# Patient Record
Sex: FEMALE | Race: WHITE | NOT HISPANIC OR LATINO | Employment: FULL TIME | URBAN - METROPOLITAN AREA
[De-identification: names, ages, dates, MRNs, and addresses within clinical notes are randomized per-mention and may not be internally consistent; named-entity substitution may affect disease eponyms.]

---

## 2017-03-27 ENCOUNTER — OFFICE VISIT (OUTPATIENT)
Dept: OBSTETRICS AND GYNECOLOGY | Age: 47
End: 2017-03-27

## 2017-03-27 VITALS
BODY MASS INDEX: 26.13 KG/M2 | SYSTOLIC BLOOD PRESSURE: 100 MMHG | WEIGHT: 142 LBS | DIASTOLIC BLOOD PRESSURE: 60 MMHG | HEIGHT: 62 IN

## 2017-03-27 DIAGNOSIS — Z01.419 ENCOUNTER FOR GYNECOLOGICAL EXAMINATION: Primary | ICD-10-CM

## 2017-03-27 PROCEDURE — 99396 PREV VISIT EST AGE 40-64: CPT | Performed by: OBSTETRICS & GYNECOLOGY

## 2017-03-27 RX ORDER — PRENATAL VIT NO.126/IRON/FOLIC 28MG-0.8MG
TABLET ORAL DAILY
COMMUNITY

## 2017-03-27 RX ORDER — VALACYCLOVIR HYDROCHLORIDE 1 G/1
TABLET, FILM COATED ORAL
Refills: 0 | COMMUNITY
Start: 2017-02-01 | End: 2020-06-10

## 2017-03-27 RX ORDER — SWAB
SWAB, NON-MEDICATED MISCELLANEOUS
COMMUNITY

## 2017-03-27 NOTE — PROGRESS NOTES
"Subjective     Chief Complaint   Patient presents with   • Gynecologic Exam     Annual         History of Present Illness      Lori Rocha is a very pleasant  47 y.o. female who presents for annual exam.  Mammo ExamThermagram only  we have reviewed multiple occasions the limited data and ability of thermograms to diagnose breast cancer and breast changes.  Patient is aware that but still declines mammogram, Contraception vas., Exercise 6 x wkly.      Obstetric History:  OB History     No data available         Menstrual History:     Patient's last menstrual period was 03/01/2017.       Sexual History:       Past Medical History:   Diagnosis Date   • Abnormal Pap smear of cervix    • Anemia    • Irregular menses    • Pap smear abnormality of cervix with ASCUS favoring benign      No past surgical history on file.    SOCIAL Hx:      The following portions of the patient's history were reviewed and updated as appropriate: allergies, current medications, past family history, past medical history, past social history, past surgical history and problem list.    Review of Systems        Except as outlined in history of physical illness, patient denies any changes in her GYN, , GI systems.  All other systems reviewed were negative.         Objective   Physical Exam    /60  Ht 62.25\" (158.1 cm)  Wt 142 lb (64.4 kg)  LMP 03/01/2017  BMI 25.76 kg/m2    General: Patient is alert and oriented and appears overall healthy  Neck: Is supple without thyromegaly, no carotid bruits and no lymphadenopathy  Lungs: Clear bilaterally, no wheezing, rhonchi, or rales.  Respiratory rate is normal  Breast: Even, symmetrical, no lymphadenopathy, no retraction, no masses or cysts  Heart: Regular rate and rhythm are appreciated, no murmurs or rubs are heard  Abdomen: Is soft, without organomegaly, bowel sounds are positive, there is no                                rebound or guarding and palpation does not produce any " discomfort  Back: Nontender without CVA tenderness  Pelvic: External genitalia appear normal and consistent with mature female.  BUS normal                            Vagina is clean dry without discharge and appears adequately estrogenized, no               lesions or masses are present                         Cervix is noninflamed without discharge or lesions.  There is no cervical motion             tenderness.                Uterus is nonenlarged, without tenderness, and no masses or abnormalities are  present               Adnexa are non-enlarged, non tender               Rectal exam reveals adequate sphincter tone and no masses or lesions are                     appreciated on digital rectal examination.    There is no problem list on file for this patient.                Assessment/Plan   Lori was seen today for gynecologic exam.    Diagnoses and all orders for this visit:    Encounter for gynecological examination  -     IGP, Apt HPV,rfx 16 / 18,45     patient refuses mammogram, she is getting her wellness labs with her PCP   Annual Well Woman Exam    Discussed today's findings and concerns with patient in detail.  Continue to recommend regular exercise to include cardiovascular and resistance training and breast self-exam. Wellness lab, mammography, and pap smear, in accordance with age guidelines.  Nutritional and caloric recommendations discussed.    All of the patient's questions were addressed and answered, I have encouraged her to call for today's test results if she has not received them within 10 days.  Patient is advised to call with any change in her condition or with any other questions, otherwise return in 12 months for annual examination.

## 2017-03-30 LAB
CYTOLOGIST CVX/VAG CYTO: NORMAL
CYTOLOGY CVX/VAG DOC THIN PREP: NORMAL
DX ICD CODE: NORMAL
HIV 1 & 2 AB SER-IMP: NORMAL
HPV I/H RISK 4 DNA CVX QL PROBE+SIG AMP: NEGATIVE
Lab: NORMAL
OTHER STN SPEC: NORMAL
PATH REPORT.FINAL DX SPEC: NORMAL
STAT OF ADQ CVX/VAG CYTO-IMP: NORMAL

## 2018-04-03 ENCOUNTER — OFFICE VISIT (OUTPATIENT)
Dept: OBSTETRICS AND GYNECOLOGY | Age: 48
End: 2018-04-03

## 2018-04-03 VITALS
DIASTOLIC BLOOD PRESSURE: 64 MMHG | BODY MASS INDEX: 26.13 KG/M2 | WEIGHT: 142 LBS | HEIGHT: 62 IN | SYSTOLIC BLOOD PRESSURE: 110 MMHG

## 2018-04-03 DIAGNOSIS — N95.1 MENOPAUSAL SYMPTOMS: ICD-10-CM

## 2018-04-03 DIAGNOSIS — Z01.419 ENCOUNTER FOR GYNECOLOGICAL EXAMINATION: Primary | ICD-10-CM

## 2018-04-03 PROCEDURE — 99396 PREV VISIT EST AGE 40-64: CPT | Performed by: OBSTETRICS & GYNECOLOGY

## 2018-04-03 NOTE — PROGRESS NOTES
"Subjective   Chief Complaint   Patient presents with   • Gynecologic Exam     Annual:Thermagram 4/2017,low risk      History of Present Illness    Lori Rocha is a very pleasant  48 y.o. female who presents for annual exam.  , Mammo Exam Continues to decline only once a thermogram's, we have reviewed the liability's of that type and screen, Contraception menopausal, Exercise times a week.    Obstetric History:  OB History     No data available         Menstrual History:     Patient's last menstrual period was 02/26/2018 (approximate).       Sexual History:       Past Medical History:   Diagnosis Date   • Abnormal Pap smear of cervix    • Anemia    • Irregular menses    • Pap smear abnormality of cervix with ASCUS favoring benign      No past surgical history on file.    SOCIAL Hx:      The following portions of the patient's history were reviewed and updated as appropriate: allergies, current medications, past family history, past medical history, past social history, past surgical history and problem list.    Review of Systems        Except as outlined in history of physical illness, patient denies any changes in her GYN, , GI systems.  All other systems reviewed are negative         Objective   Physical Exam    /64   Ht 158.1 cm (62.25\")   Wt 64.4 kg (142 lb)   LMP 02/26/2018 (Approximate)   BMI 25.76 kg/m²     General: Patient is alert and oriented and appears overall healthy  Neck: Is supple without thyromegaly, no carotid bruits and no lymphadenopathy  Lungs: Clear bilaterally, no wheezing, rhonchi, or rales.  Respiratory rate is normal  Breast: Even symmetrical, no lymphadenopathy, no retraction, no masses appreciated on either side  Heart: Regular rate and rhythm are appreciated, no murmurs or rubs are heard  Abdomen: Is soft, without organomegaly, bowel sounds are positive, there is no                                rebound or guarding and palpation does not produce any discomfort  Back: " Nontender without CVA tenderness  Pelvic: External genitalia appear normal and consistent with mature female.  BUS normal              Urethra appears normal and without mass, bladder is nontender and without any               Masses.               Vagina is clean dry without discharge and appears adequately estrogenized, no               lesions or masses are present                         Cervix is noninflamed without discharge or lesions.  There is no cervical motion             tenderness.                Uterus is nonenlarged, without tenderness, and no masses or abnormalities are  present               Adnexa are non-enlarged, non tender               Rectal exam reveals adequate sphincter tone and no masses or lesions are                     appreciated on digital rectal examination.     There is no problem list on file for this patient.               Assessment/Plan   Lori was seen today for gynecologic exam.    Diagnoses and all orders for this visit:    Encounter for gynecological examination  -     IGP, Apt HPV,rfx 16 / 18,45 - ThinPrep Vial, Cervix    Menopausal symptoms        Annual Well Woman Exam    Discussed today's findings and concerns with patient in detail.  Continue to recommend regular exercise to include cardiovascular and resistance training and breast self-exam. Wellness lab, mammography, & pap smear, in accordance with age guidelines.  Dietary and caloric recommendations were discussed.        All of the patient's questions were addressed and answered, I have encouraged her to call for today's test results if she has not received them within 10 days.  Patient is advised to call with any change in her condition or with any other questions, otherwise return in 12 months for annual examination.

## 2018-04-05 LAB
CYTOLOGIST CVX/VAG CYTO: NORMAL
CYTOLOGY CVX/VAG DOC THIN PREP: NORMAL
DX ICD CODE: NORMAL
HIV 1 & 2 AB SER-IMP: NORMAL
HPV I/H RISK 4 DNA CVX QL PROBE+SIG AMP: NEGATIVE
OTHER STN SPEC: NORMAL
PATH REPORT.FINAL DX SPEC: NORMAL
STAT OF ADQ CVX/VAG CYTO-IMP: NORMAL

## 2018-04-20 ENCOUNTER — TELEPHONE (OUTPATIENT)
Dept: OBSTETRICS AND GYNECOLOGY | Age: 48
End: 2018-04-20

## 2018-04-20 NOTE — TELEPHONE ENCOUNTER
Pt.notified of results.She wanted  to know that she could not have been happier with the service she received from [facility].

## 2018-04-20 NOTE — TELEPHONE ENCOUNTER
----- Message from Héctor Laguna MD sent at 4/18/2018  8:53 PM EDT -----  Please notify pt. That labs are with in normal limits

## 2018-12-07 ENCOUNTER — TELEPHONE (OUTPATIENT)
Dept: OBSTETRICS AND GYNECOLOGY | Age: 48
End: 2018-12-07

## 2019-04-09 ENCOUNTER — OFFICE VISIT (OUTPATIENT)
Dept: OBSTETRICS AND GYNECOLOGY | Age: 49
End: 2019-04-09

## 2019-04-09 VITALS
DIASTOLIC BLOOD PRESSURE: 64 MMHG | WEIGHT: 143 LBS | BODY MASS INDEX: 26.31 KG/M2 | SYSTOLIC BLOOD PRESSURE: 108 MMHG | HEIGHT: 62 IN

## 2019-04-09 DIAGNOSIS — Z01.419 ENCOUNTER FOR GYNECOLOGICAL EXAMINATION: Primary | ICD-10-CM

## 2019-04-09 PROCEDURE — 99396 PREV VISIT EST AGE 40-64: CPT | Performed by: OBSTETRICS & GYNECOLOGY

## 2019-04-09 RX ORDER — FUROSEMIDE 20 MG/1
TABLET ORAL
Refills: 0 | COMMUNITY
Start: 2019-03-06 | End: 2021-05-11

## 2019-04-09 RX ORDER — PRASTERONE (DHEA) 50 MG
20 CAPSULE ORAL DAILY
COMMUNITY

## 2019-04-09 RX ORDER — PHENTERMINE HYDROCHLORIDE 37.5 MG/1
TABLET ORAL
Refills: 0 | COMMUNITY
Start: 2019-03-21 | End: 2021-05-11

## 2019-04-09 RX ORDER — MULTIVIT WITH MINERALS/LUTEIN
250 TABLET ORAL DAILY
COMMUNITY

## 2019-04-09 NOTE — PROGRESS NOTES
"Subjective   Chief Complaint   Patient presents with   • Gynecologic Exam     Annual:last pap 4/2018,cologuard 4/2018,thermagram only      History of Present Illness    Lori Rocha is a very pleasant  49 y.o. female who presents for annual exam.  , Mammo Exam patient declines, , Exercise 6 times a week  Patient has no gynecological concerns or complaints.  She is perimenopausal cycles are a little bit irregular.  She continues to refuse mammogram.  She is on bioidentical hormones and they are checking her labs.  We recommend mended colonoscopy.  She has had some stress with her family.  There is been an unplanned pregnancy..    Obstetric History:  OB History     No data available         Menstrual History:     Patient's last menstrual period was 03/22/2019 (approximate).       Sexual History:       Past Medical History:   Diagnosis Date   • Abnormal Pap smear of cervix    • Anemia    • Irregular menses    • Pap smear abnormality of cervix with ASCUS favoring benign      History reviewed. No pertinent surgical history.    SOCIAL Hx:      The following portions of the patient's history were reviewed and updated as appropriate: allergies, current medications, past family history, past medical history, past social history, past surgical history and problem list.    Review of Systems        Except as outlined in history of physical illness, patient denies any changes in her GYN, , GI systems.  All other systems reviewed are negative         Objective   Physical Exam    /64   Ht 158.1 cm (62.25\")   Wt 64.9 kg (143 lb)   LMP 03/22/2019 (Approximate)   BMI 25.95 kg/m²     General: Patient is alert and oriented and appears overall healthy  Neck: Is supple without thyromegaly, no carotid bruits and no lymphadenopathy  Lungs: Clear bilaterally, no wheezing, rhonchi, or rales.  Respiratory rate is normal  Breast: Even symmetrical, no lymphadenopathy, no retraction, no masses appreciated on either side  Heart: " Regular rate and rhythm are appreciated, no murmurs or rubs are heard  Abdomen: Is soft, without organomegaly, bowel sounds are positive, there is no                                rebound or guarding and palpation does not produce any discomfort  Back: Nontender without CVA tenderness  Pelvic: External genitalia appear normal and consistent with mature female.  BUS normal              Urethra appears normal and without mass, bladder is nontender and without any               Masses.               Vagina is clean dry without discharge and appears adequately estrogenized, no               lesions or masses are present                         Cervix is noninflamed without discharge or lesions.  There is no cervical motion             tenderness.                Uterus is nonenlarged, without tenderness, and no masses or abnormalities are  present               Adnexa are non-enlarged, non tender               Rectal exam reveals adequate sphincter tone and no masses or lesions are                     appreciated on digital rectal examination.      Annual Well Woman Exam     There is no problem list on file for this patient.               Assessment/Plan   Lori was seen today for gynecologic exam.    Diagnoses and all orders for this visit:    Encounter for gynecological examination  -     IGP, Apt HPV,rfx 16 / 18,45      Again strongly recommended mammogram and colonoscopy patient declines at this stage.  She had a Cologuard April 2018    Discussed today's findings and concerns with patient in detail.  Continue to recommend regular exercise to include cardiovascular and resistance training and breast self-exam. Wellness lab, mammography, & pap smear, in accordance with age guidelines.  Dietary and caloric recommendations were discussed.        All of the patient's questions were addressed and answered, I have encouraged her to call for today's test results if she has not received them within 10 days.  Patient is  advised to call with any change in her condition or with any other questions, otherwise return in 12 months for annual examination.

## 2020-06-10 ENCOUNTER — TELEPHONE (OUTPATIENT)
Dept: OBSTETRICS AND GYNECOLOGY | Age: 50
End: 2020-06-10

## 2020-06-10 ENCOUNTER — OFFICE VISIT (OUTPATIENT)
Dept: OBSTETRICS AND GYNECOLOGY | Age: 50
End: 2020-06-10

## 2020-06-10 VITALS
DIASTOLIC BLOOD PRESSURE: 72 MMHG | SYSTOLIC BLOOD PRESSURE: 112 MMHG | BODY MASS INDEX: 26.5 KG/M2 | WEIGHT: 144 LBS | HEIGHT: 62 IN

## 2020-06-10 DIAGNOSIS — Z01.419 ENCOUNTER FOR GYNECOLOGICAL EXAMINATION: Primary | ICD-10-CM

## 2020-06-10 DIAGNOSIS — Z71.89 COUNSELING FOR HORMONE REPLACEMENT THERAPY: ICD-10-CM

## 2020-06-10 PROCEDURE — 99396 PREV VISIT EST AGE 40-64: CPT | Performed by: OBSTETRICS & GYNECOLOGY

## 2020-06-10 RX ORDER — SODIUM PHOSPHATE,MONO-DIBASIC 19G-7G/118
ENEMA (ML) RECTAL
COMMUNITY
End: 2021-05-11

## 2020-06-10 RX ORDER — KETOCONAZOLE 20 MG/ML
SHAMPOO TOPICAL
COMMUNITY
Start: 2020-04-01

## 2020-06-10 NOTE — TELEPHONE ENCOUNTER
Pt called, She stated her insurance carrier [employer] told her she doesn't have to wait 365 + a day for her next annual.  Pt wants to schedule for May 11, 2021 @ 10:30.    Spoke w/Adina Holden successfully did an override and appt was scheduled.

## 2020-06-10 NOTE — PROGRESS NOTES
"Subjective   Chief Complaint   Patient presents with   • Gynecologic Exam     Annual:last pap 4/19,Thermagram,6/20,cologuard 4/18      History of Present Illness    Lori Rocha is a very pleasant  50 y.o. female who presents for annual exam.  , Mammo Exam continues to decline despite recommendations otherwise she has had a thermogram and we talked about the significant limitations of that testing procedure, , Exercise 6 times a week  She is postmenopausal, receiving wellness labs from her PCP she also declines colonoscopy but had a Cologuard 2018 which was normal.  She is prediabetic and we discussed that.  She continues to use compound pharmacy and is under the care of someone else for that.  We have discussed risk of that hormonal approach  .    Obstetric History:  OB History    None        Menstrual History:     Patient's last menstrual period was 03/22/2019 (approximate).       Sexual History:       Past Medical History:   Diagnosis Date   • Abnormal Pap smear of cervix    • Anemia    • Pap smear abnormality of cervix with ASCUS favoring benign      History reviewed. No pertinent surgical history.    SOCIAL Hx:      The following portions of the patient's history were reviewed and updated as appropriate: allergies, current medications, past family history, past medical history, past social history, past surgical history and problem list.    Review of Systems        Except as outlined in history of physical illness, patient denies any changes in her GYN, , GI systems.  All other systems reviewed are negative         Objective   Physical Exam    /72   Ht 158.1 cm (62.25\")   Wt 65.3 kg (144 lb)   LMP 03/22/2019 (Approximate)   Breastfeeding No   BMI 26.13 kg/m²     General: Patient is alert and oriented and appears overall healthy  Neck: Is supple without thyromegaly, no carotid bruits and no lymphadenopathy  Lungs: Clear bilaterally, no wheezing, rhonchi, or rales.  Respiratory rate is " normal  Breast: Even symmetrical, no lymphadenopathy, no retraction, no masses appreciated on either side  Heart: Regular rate and rhythm are appreciated, no murmurs or rubs are heard  Abdomen: Is soft, without organomegaly, bowel sounds are positive, there is no                                rebound or guarding and palpation does not produce any discomfort  Back: Nontender without CVA tenderness  Pelvic: External genitalia appear normal and consistent with mature female.  BUS normal              Urethra appears normal and without mass, bladder is nontender and without any               Masses.               Vagina is clean dry without discharge and appears adequately estrogenized, no               lesions or masses are present                         Cervix is noninflamed without discharge or lesions.  There is no cervical motion             tenderness.                Uterus is nonenlarged, without tenderness, and no masses or abnormalities are  present               Adnexa are non-enlarged, non tender               Rectal exam reveals adequate sphincter tone and no masses or lesions are                     appreciated on digital rectal examination.      Annual Well Woman Exam     There is no problem list on file for this patient.               Assessment/Plan   Lori was seen today for gynecologic exam.    Diagnoses and all orders for this visit:    Encounter for gynecological examination  -     IGP, Apt HPV,rfx 16 / 18,45    Counseling for hormone replacement therapy    I am not directing this, she is under the care of someone else for bioidentical hormones and I have reviewed risk of that approach  Discussed today's findings and concerns with patient.  Continue to recommend regular exercise including cardiovascular and resistance training as well as  breast self-exam. Wellness lab, mammography, & pap smear, in accordance with age guidelines.    I have encouraged her to call for today's test results if she has  not received them within 10 days.  Patient is advised to call with any change in her condition or with any other questions, otherwise return  for annual examination.

## 2020-06-10 NOTE — TELEPHONE ENCOUNTER
Pt was here today and wanted to schedule her AE for May 11, 2021 at 10:30.  Her insurance pays for annual exams per calendar year.      Her ae this year was r/s due to covid.  She does not want to have AE in the summer.  It would not let me schedule May 11, 2021 - hard stop.

## 2020-06-12 LAB
CYTOLOGIST CVX/VAG CYTO: NORMAL
CYTOLOGY CVX/VAG DOC CYTO: NORMAL
CYTOLOGY CVX/VAG DOC THIN PREP: NORMAL
DX ICD CODE: NORMAL
HIV 1 & 2 AB SER-IMP: NORMAL
HPV I/H RISK 4 DNA CVX QL PROBE+SIG AMP: NEGATIVE
OTHER STN SPEC: NORMAL
STAT OF ADQ CVX/VAG CYTO-IMP: NORMAL

## 2021-05-11 ENCOUNTER — OFFICE VISIT (OUTPATIENT)
Dept: OBSTETRICS AND GYNECOLOGY | Age: 51
End: 2021-05-11

## 2021-05-11 VITALS
DIASTOLIC BLOOD PRESSURE: 68 MMHG | WEIGHT: 141 LBS | HEIGHT: 62 IN | BODY MASS INDEX: 25.95 KG/M2 | SYSTOLIC BLOOD PRESSURE: 110 MMHG

## 2021-05-11 DIAGNOSIS — Z01.419 ENCOUNTER FOR GYNECOLOGICAL EXAMINATION: Primary | ICD-10-CM

## 2021-05-11 PROCEDURE — 99396 PREV VISIT EST AGE 40-64: CPT | Performed by: OBSTETRICS & GYNECOLOGY

## 2021-05-11 RX ORDER — LEVOTHYROXINE SODIUM 75 MCG
TABLET ORAL
COMMUNITY
Start: 2021-04-20

## 2021-05-11 RX ORDER — THYROID,PORK 90 MG
TABLET ORAL
COMMUNITY
Start: 2021-03-03

## 2021-05-11 RX ORDER — CHLORAL HYDRATE 500 MG
CAPSULE ORAL
COMMUNITY

## 2021-05-11 NOTE — PROGRESS NOTES
Subjective   Chief Complaint   Patient presents with   • Gynecologic Exam     Annual:Last pap 6/20,Thermagram 6/20,cologuard,4/18      History of Present Illness    Lori Rocha is a very pleasant  51 y.o. female who presents for annual exam.  , Mammo Exam last 1 was in 2012 she continues to refuse repeat mammogram she is doing thermograms and we have discussed the limitations of those, , Exercise 6 times a week including running and walking  Patient's had Cologuard April 2018 that is all being managed by her PCP  She overall remains relatively healthy, she is exercising normally, she has had some issues with SIBO and I have discussed some options.  Her 4 children are doing well..    Obstetric History:  OB History    No obstetric history on file.        Menstrual History:     Patient's last menstrual period was 03/22/2019 (approximate).       Sexual History:       Past Medical History:   Diagnosis Date   • Abnormal Pap smear of cervix    • Anemia    • Pap smear abnormality of cervix with ASCUS favoring benign      No past surgical history on file.    Current Outpatient Medications:   •  [place] [place] Thyroid 90 MG tablet, , Disp: , Rfl:   •  DHEA 50 MG capsule, Take  by mouth., Disp: , Rfl:   •  ketoconazole (NIZORAL) 2 % shampoo, USE 10 MLS TOPICALLY TO SCALP TWICE A WEEK, Disp: , Rfl:   •  Methylcobalamin (METHYL B-12 PO), Take  by mouth., Disp: , Rfl:   •  Multiple Vitamin (MULTI-VITAMIN DAILY PO), Take  by mouth., Disp: , Rfl:   •  Omega-3 1000 MG capsule, Take  by mouth., Disp: , Rfl:   •  Probiotic Product (PROBIOTIC ADVANCED PO), Take  by mouth., Disp: , Rfl:   •  Synthroid 75 MCG tablet, , Disp: , Rfl:   •  vitamin C (ASCORBIC ACID) 250 MG tablet, Take 250 mg by mouth Daily., Disp: , Rfl:   •  Vitamin D, Cholecalciferol, 400 UNITS capsule, Take  by mouth., Disp: , Rfl:   •  diclofenac (VOLTAREN) 1 % gel gel, Place 2 g on the skin., Disp: , Rfl:   •  Prenatal Vit-Fe Fumarate-FA (PRENATAL, CLASSIC, VITAMIN) 28-0.8  "MG tablet tablet, Take  by mouth Daily., Disp: , Rfl:   •  progesterone (PROMETRIUM) 100 MG capsule, Take 100 mg by mouth Daily., Disp: , Rfl:    SOCIAL Hx:      The following portions of the patient's history were reviewed and updated as appropriate: allergies, current medications, past family history, past medical history, past social history, past surgical history and problem list.    Review of Systems        Except as outlined in history of physical illness, patient denies any changes in her GYN, , GI systems.  All other systems reviewed are negative         Objective   Physical Exam    /68   Ht 158.1 cm (62.25\")   Wt 64 kg (141 lb)   LMP 03/22/2019 (Approximate)   Breastfeeding No   BMI 25.58 kg/m²     General: Patient is alert and oriented and appears overall healthy  Neck: Is supple without thyromegaly, no carotid bruits and no lymphadenopathy  Lungs: Clear bilaterally, no wheezing, rhonchi, or rales.  Respiratory rate is normal  Breast: Even symmetrical, no lymphadenopathy, no retraction, no discharge ,no masses appreciated on either side  Heart: Regular rate and rhythm are appreciated, no murmurs or rubs are heard  Abdomen: Is soft, without organomegaly, bowel sounds are positive, there is no                                rebound or guarding and palpation does not produce any discomfort  Back: Nontender without CVA tenderness  Pelvic: External genitalia appear normal and consistent with mature female.  BUS normal              Urethra appears normal and without mass, bladder is nontender and without any lesions                        Urethral meatus is normal without scarring tenderness or masses                 Bladder is without tenderness or fullness                           Vagina is clean dry without discharge and appears adequately estrogenized, no               lesions or masses are present                         Cervix is noninflamed without discharge or lesions.  There is no cervical " motion             tenderness.                Uterus is nonenlarged, without tenderness, and no masses or abnormalities are  present               Adnexa are non-enlarged, non tender               Rectal digital  exam reveals adequate sphincter tone and no masses or lesions are                     appreciated on digital rectal examination.      Annual Well Woman Exam     There is no problem list on file for this patient.               Assessment/Plan   Diagnoses and all orders for this visit:    1. Encounter for gynecological examination (Primary)  -     IGP, Apt HPV,rfx 16 / 18,45      Discussed today's findings and concerns with patient.  Continue to recommend regular exercise including cardiovascular and resistance training as well as  breast self-exam. Wellness lab, mammography, & pap smear, in accordance with age guidelines.    I have encouraged her to call for today's test results if she has not received them within 10 days.  Patient is advised to call with any change in her condition or with any other questions, otherwise return  for annual examination.

## 2021-09-15 ENCOUNTER — TELEPHONE (OUTPATIENT)
Dept: GASTROENTEROLOGY | Facility: CLINIC | Age: 51
End: 2021-09-15

## 2021-09-15 ENCOUNTER — OFFICE VISIT (OUTPATIENT)
Dept: GASTROENTEROLOGY | Facility: CLINIC | Age: 51
End: 2021-09-15

## 2021-09-15 VITALS
HEIGHT: 62 IN | BODY MASS INDEX: 26.5 KG/M2 | DIASTOLIC BLOOD PRESSURE: 68 MMHG | SYSTOLIC BLOOD PRESSURE: 120 MMHG | WEIGHT: 144 LBS

## 2021-09-15 DIAGNOSIS — R14.3 FLATUS: Primary | ICD-10-CM

## 2021-09-15 PROCEDURE — 99204 OFFICE O/P NEW MOD 45 MIN: CPT | Performed by: INTERNAL MEDICINE

## 2021-09-15 NOTE — PROGRESS NOTES
Chief Complaint   Patient presents with   • Gas       History of Present Illness:   51 y.o. female who is here for flatus which started when she had clostridium dificile 6 yrs ago after taking antibiotics. She takes fiber daily (Fiberwise 1 scoop BID), digestive enzymes, Florajen daily (probiotic). No diarrhea. No constiaption. 2 BM/day. If she eats gluten or sugar she will have more flatus. She fasts 16 hrs/day. No rectal bleeding or melena. Never had a colonoscopy. Cologuard 3 yrs ago negative. Sister had colon polyps. No abdominal or chest pain. She eats some dairy. She is a weight . She has gained weight. No nausea or vomiting. No heartburn. NO dysphagia.        She had Covid 8 mos ago. Not had the vaccine. Her brother was quite ill from Covid.     Past Medical History:   Diagnosis Date   • Abnormal Pap smear of cervix    • Anemia    • Pap smear abnormality of cervix with ASCUS favoring benign        History reviewed. No pertinent surgical history.      Current Outpatient Medications:   •  Chromium 500 MCG tablet, Take  by mouth Daily., Disp: , Rfl:   •  DHEA 50 MG capsule, Take 20 mg by mouth Daily., Disp: , Rfl:   •  ketoconazole (NIZORAL) 2 % shampoo, USE 10 MLS TOPICALLY TO SCALP TWICE A WEEK, Disp: , Rfl:   •  Methylcobalamin (METHYL B-12 PO), Take  by mouth., Disp: , Rfl:   •  Multiple Vitamin (MULTI-VITAMIN DAILY PO), Take  by mouth., Disp: , Rfl:   •  NON FORMULARY, Daily. Generic fiber drink supplement, Disp: , Rfl:   •  Omega-3 1000 MG capsule, Take  by mouth., Disp: , Rfl:   •  Probiotic Product (PROBIOTIC ADVANCED PO), Take  by mouth., Disp: , Rfl:   •  vitamin C (ASCORBIC ACID) 250 MG tablet, Take 250 mg by mouth Daily., Disp: , Rfl:   •  Vitamin D, Cholecalciferol, 400 UNITS capsule, Take  by mouth., Disp: , Rfl:   •  [place] Thyroid 90 MG tablet, , Disp: , Rfl:   •  diclofenac (VOLTAREN) 1 % gel gel, Place 2 g on the skin., Disp: , Rfl:   •  Prenatal Vit-Fe Fumarate-FA (PRENATAL, CLASSIC,  VITAMIN) 28-0.8 MG tablet tablet, Take  by mouth Daily., Disp: , Rfl:   •  progesterone (PROMETRIUM) 100 MG capsule, Take 100 mg by mouth Daily., Disp: , Rfl:   •  Synthroid 75 MCG tablet, , Disp: , Rfl:     Allergies   Allergen Reactions   • No Known Drug Allergy        History reviewed. No pertinent family history.    Social History     Socioeconomic History   • Marital status:      Spouse name: Not on file   • Number of children: Not on file   • Years of education: Not on file   • Highest education level: Not on file   Tobacco Use   • Smoking status: Never Smoker   • Smokeless tobacco: Never Used   Substance and Sexual Activity   • Alcohol use: No   • Drug use: No       Review of Systems   Gastrointestinal: Negative for abdominal distention, abdominal pain, constipation and diarrhea.   All other systems reviewed and are negative.    Pertinent positives and negatives documented in the HPI and all other systems reviewed and were found to be negative.  Vitals:    09/15/21 1022   BP: 120/68       Physical Exam  Vitals reviewed.   Constitutional:       General: She is not in acute distress.     Appearance: Normal appearance. She is well-developed. She is not diaphoretic.   HENT:      Head: Normocephalic and atraumatic. Hair is normal.      Right Ear: Hearing, tympanic membrane, ear canal and external ear normal. No decreased hearing noted. No drainage.      Left Ear: Hearing, tympanic membrane, ear canal and external ear normal. No decreased hearing noted.      Nose: Nose normal. No nasal deformity.      Mouth/Throat:      Mouth: Mucous membranes are moist.   Eyes:      General: Lids are normal.         Right eye: No discharge.         Left eye: No discharge.      Extraocular Movements: Extraocular movements intact.      Conjunctiva/sclera: Conjunctivae normal.      Pupils: Pupils are equal, round, and reactive to light.   Neck:      Thyroid: No thyromegaly.      Vascular: No JVD.      Trachea: No tracheal  deviation.   Cardiovascular:      Rate and Rhythm: Normal rate and regular rhythm.      Pulses: Normal pulses.      Heart sounds: Normal heart sounds. No murmur heard.   No friction rub. No gallop.    Pulmonary:      Effort: Pulmonary effort is normal. No respiratory distress.      Breath sounds: Normal breath sounds. No wheezing or rales.   Chest:      Chest wall: No tenderness.   Abdominal:      General: Bowel sounds are normal. There is no distension.      Palpations: Abdomen is soft. There is no mass.      Tenderness: There is no abdominal tenderness. There is no guarding or rebound.      Hernia: No hernia is present.   Genitourinary:     Rectum: Normal. Guaiac result negative.   Musculoskeletal:         General: No tenderness or deformity. Normal range of motion.      Cervical back: Normal range of motion and neck supple.   Lymphadenopathy:      Cervical: No cervical adenopathy.   Skin:     General: Skin is warm and dry.      Findings: No erythema or rash.   Neurological:      Mental Status: She is alert and oriented to person, place, and time.      Cranial Nerves: No cranial nerve deficit.      Motor: No abnormal muscle tone.      Coordination: Coordination normal.      Deep Tendon Reflexes: Reflexes are normal and symmetric. Reflexes normal.   Psychiatric:         Mood and Affect: Mood normal.         Behavior: Behavior normal.         Thought Content: Thought content normal.         Judgment: Judgment normal.         Diagnoses and all orders for this visit:    1. Flatus (Primary)  -     Celiac Ab tTG DGP TIgA      Assessment:  1. Flatus - she is on lots of supplements.  2. FH (sister) colon polyps.  3.     Recommendations:  1. Get the results of labs done by PCP recently.   2. Labs: Celiac sprue antibody test.  3. Consider stopping all supplements for one month. Will hold off on.  4. Lactose free diet for 1 month.  5. I recommend a colononscopy. I want her to think about it.   6. F/u 4 weeks.     No  follow-ups on file.    Reece Joel MD  9/15/2021

## 2021-09-15 NOTE — TELEPHONE ENCOUNTER
----- Message from Reece Joel MD sent at 9/15/2021 11:20 AM EDT -----  Get the results of the recent labs done by her PCP.

## 2021-09-16 LAB
GLIADIN PEPTIDE IGA SER-ACNC: 6 UNITS (ref 0–19)
GLIADIN PEPTIDE IGG SER-ACNC: 4 UNITS (ref 0–19)
IGA SERPL-MCNC: 121 MG/DL (ref 87–352)
TTG IGA SER-ACNC: <2 U/ML (ref 0–3)
TTG IGG SER-ACNC: <2 U/ML (ref 0–5)

## 2021-09-17 NOTE — PROGRESS NOTES
09/17/21  Tell her that her celiac sprue antibody panel came back normal. FAX to her PCP.   Myke gallagher

## 2021-09-21 NOTE — TELEPHONE ENCOUNTER
Attempt call to PCP office - rings without answer.     Call to [facility] and spoke with Ophelia.  Labs of 9/13 will be faxed to .

## 2021-09-22 ENCOUNTER — TELEPHONE (OUTPATIENT)
Dept: GASTROENTEROLOGY | Facility: CLINIC | Age: 51
End: 2021-09-22

## 2021-09-22 NOTE — TELEPHONE ENCOUNTER
----- Message from Reece Joel MD sent at 9/17/2021  6:47 AM EDT -----  09/17/21  Tell her that her celiac sprue antibody panel came back normal. FAX to her PCP.   Myke gallagher

## 2022-05-17 ENCOUNTER — OFFICE VISIT (OUTPATIENT)
Dept: OBSTETRICS AND GYNECOLOGY | Age: 52
End: 2022-05-17

## 2022-05-17 VITALS
WEIGHT: 141 LBS | HEIGHT: 62 IN | SYSTOLIC BLOOD PRESSURE: 120 MMHG | BODY MASS INDEX: 25.95 KG/M2 | DIASTOLIC BLOOD PRESSURE: 76 MMHG

## 2022-05-17 DIAGNOSIS — Z01.419 ENCOUNTER FOR GYNECOLOGICAL EXAMINATION: Primary | ICD-10-CM

## 2022-05-17 DIAGNOSIS — Z71.89 COUNSELING FOR HORMONE REPLACEMENT THERAPY: ICD-10-CM

## 2022-05-17 PROCEDURE — 99396 PREV VISIT EST AGE 40-64: CPT | Performed by: OBSTETRICS & GYNECOLOGY

## 2022-05-17 RX ORDER — LEVOTHYROXINE, LIOTHYRONINE 38; 9 UG/1; UG/1
TABLET ORAL
COMMUNITY
Start: 2022-05-13

## 2022-05-17 RX ORDER — LEVOTHYROXINE, LIOTHYRONINE 9.5; 2.25 UG/1; UG/1
TABLET ORAL
COMMUNITY
Start: 2022-03-18

## 2022-05-17 RX ORDER — UBIDECARENONE 100 MG
100 CAPSULE ORAL DAILY
COMMUNITY

## 2022-05-17 RX ORDER — LEVOTHYROXINE, LIOTHYRONINE 19; 4.5 UG/1; UG/1
TABLET ORAL
COMMUNITY
Start: 2022-05-13

## 2022-05-17 NOTE — PROGRESS NOTES
Subjective   Chief Complaint   Patient presents with   • Gynecologic Exam     Annual:Last pap 5/21,Thermogram 2021,cologuard 2021      History of Present Illness    Lori Rocha is a very pleasant  52 y.o. female .  , Mammo Exam patient again declines she does thermograms and we discussed each year the need for mammogram, , Exercise 6 times a week    Patient is postmenopausal    She sees a functional medicine doctor who used to prescribe hormones but no longer doing that.  She is doing intermittent fasting.  She had a Cologuard last year..    Her family seems to be doing well    Obstetric History:  OB History    No obstetric history on file.        Menstrual History:     Patient's last menstrual period was 03/22/2019 (approximate).       Sexual History:       Past Medical History:   Diagnosis Date   • Abnormal Pap smear of cervix    • Anemia    • Pap smear abnormality of cervix with ASCUS favoring benign      No past surgical history on file.    Current Outpatient Medications:   •  Chromium 500 MCG tablet, Take  by mouth Daily., Disp: , Rfl:   •  coenzyme Q10 100 MG capsule, Take 100 mg by mouth Daily., Disp: , Rfl:   •  DHEA 50 MG capsule, Take 20 mg by mouth Daily., Disp: , Rfl:   •  Multiple Vitamin (MULTI-VITAMIN DAILY PO), Take  by mouth., Disp: , Rfl:   •  NON FORMULARY, Daily. Generic fiber drink supplement, Disp: , Rfl:   •  NP Thyroid 15 MG tablet, , Disp: , Rfl:   •  NP Thyroid 30 MG tablet, TAKE 1/2 TABLET BY MOUTH EVERY MORNING WITH 60MG TABLET FOR A TOTAL OF 75 MG DAILY, Disp: , Rfl:   •  NP Thyroid 60 MG tablet, TAKE ONE TABLET BY MOUTH EVERY MORNING WITH 15MG FOR A TOTAL OF 75 MG DAILY, Disp: , Rfl:   •  Omega-3 1000 MG capsule, Take  by mouth., Disp: , Rfl:   •  Prenatal Vit-Fe Fumarate-FA (PRENATAL, CLASSIC, VITAMIN) 28-0.8 MG tablet tablet, Take  by mouth Daily., Disp: , Rfl:   •  progesterone (PROMETRIUM) 100 MG capsule, Take 100 mg by mouth Daily., Disp: , Rfl:   •  Vitamin D, Cholecalciferol,  "400 UNITS capsule, Take  by mouth., Disp: , Rfl:   •  [place] Thyroid 90 MG tablet, , Disp: , Rfl:   •  Continuous Blood Gluc Sensor (FreeStyle Thuy 2 Sensor) misc, USE TO monitor blood sugar WITH Free Style Thuy APPLICATORFUL, Disp: , Rfl:   •  diclofenac (VOLTAREN) 1 % gel gel, Place 2 g on the skin., Disp: , Rfl:   •  ketoconazole (NIZORAL) 2 % shampoo, USE 10 MLS TOPICALLY TO SCALP TWICE A WEEK, Disp: , Rfl:   •  Methylcobalamin (METHYL B-12 PO), Take  by mouth., Disp: , Rfl:   •  Probiotic Product (PROBIOTIC ADVANCED PO), Take  by mouth., Disp: , Rfl:   •  Synthroid 75 MCG tablet, , Disp: , Rfl:   •  vitamin C (ASCORBIC ACID) 250 MG tablet, Take 250 mg by mouth Daily., Disp: , Rfl:    SOCIAL Hx:  [unfilled]    The following portions of the patient's history were reviewed and updated as appropriate: allergies, current medications, past family history, past medical history, past social history, past surgical history and problem list.    Review of Systems      Urinary incontinence assessment discussed      Except as outlined in history of physical illness, patient denies any changes in her GYN, , GI systems.  All other systems reviewed are negative         Objective   Physical Exam    /76   Ht 158.1 cm (62.25\")   Wt 64 kg (141 lb)   LMP 03/22/2019 (Approximate)   Breastfeeding No   BMI 25.58 kg/m²     General: Patient is alert and oriented and appears overall healthy  Neck: Is supple without thyromegaly, no carotid bruits and no lymphadenopathy  Lungs: Clear bilaterally, no wheezing, rhonchi, or rales.  Respiratory rate is normal  Breast: Even symmetrical, no lymphadenopathy, no retraction, no discharge ,no masses , lumps appreciated on either side  Heart: Regular rate and rhythm are appreciated, no murmurs or rubs are heard  Abdomen: Is soft, without organomegaly, bowel sounds are positive, there is no rebound or guarding and palpation does not produce any discomfort  Back: Nontender without CVA " tenderness  Pelvic: External genitalia appear normal and consistent with mature female.  BUS normal                Urethra appears normal and without mass, bladder is nontender and without any lesions                        Urethral meatus is normal without scarring tenderness or masses                 Bladder is without tenderness or fullness                           Vagina is clean dry without discharge and , no lesions or masses are present                         Cervix is noninflamed without discharge or lesions.  There is no cervical motion tenderness.                Uterus is nonenlarged, without tenderness, and no masses or abnormalities are  present               Adnexa are non-enlarged, non tender               Rectal digital  exam reveals adequate sphincter tone and no masses or lesions are appreciated on digital rectal examination.       There is no problem list on file for this patient.               Assessment & Plan   Diagnoses and all orders for this visit:    1. Encounter for gynecological examination (Primary)  -     IGP, Apt HPV,rfx 16 / 18,45    2. Counseling for hormone replacement therapy    Patient is no longer doing bioidentical with a functional medicine physician.  We spent a long time talking about nutrition and exercise the pros and cons of intermittent fasting.    Fortunately she continues to decline mammogram  Discussed today's findings and concerns with patient.  Continue to recommend regular exercise including cardiovascular and resistance training as well as  breast self-exam. Wellness lab, mammography, & pap smear, in accordance with age guidelines.    I have encouraged her to call for today's test results if she has not received them within 10 days.  Patient is advised to call with any change in her condition or with any other questions, otherwise return  for annual examination.

## 2023-06-05 ENCOUNTER — OFFICE VISIT (OUTPATIENT)
Dept: OBSTETRICS AND GYNECOLOGY | Age: 53
End: 2023-06-05
Payer: COMMERCIAL

## 2023-06-05 VITALS
SYSTOLIC BLOOD PRESSURE: 110 MMHG | DIASTOLIC BLOOD PRESSURE: 68 MMHG | BODY MASS INDEX: 24.84 KG/M2 | HEIGHT: 62 IN | WEIGHT: 135 LBS

## 2023-06-05 DIAGNOSIS — Z12.31 BREAST CANCER SCREENING BY MAMMOGRAM: Primary | ICD-10-CM

## 2023-06-05 DIAGNOSIS — Z01.419 ENCOUNTER FOR GYNECOLOGICAL EXAMINATION: ICD-10-CM

## 2023-06-05 DIAGNOSIS — Z00.00 ENCOUNTER FOR ANNUAL PHYSICAL EXAM: ICD-10-CM

## 2023-06-05 DIAGNOSIS — Z12.4 SCREENING FOR CERVICAL CANCER: ICD-10-CM

## 2023-06-05 PROCEDURE — 99396 PREV VISIT EST AGE 40-64: CPT | Performed by: OBSTETRICS & GYNECOLOGY

## 2023-06-05 NOTE — PROGRESS NOTES
Subjective   No chief complaint on file.     History of Present Illness  Wellness exam  Lori Rocha is a very pleasant  53 y.o. female .  , Mammo Exam yes, , Exercise encouraged  Patient here for annual exam no gynecological concerns or complaints  Medicines.  Patient has PCP she is getting wellness labs there.  She has historically declined mammograms continues to do so despite strong recommendations otherwise.    Obstetric History:  OB History    No obstetric history on file.        Menstrual History:     Patient's last menstrual period was 03/22/2019 (approximate).       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    Current Outpatient Medications:     [place] Thyroid 90 MG tablet, , Disp: , Rfl:     Chromium 500 MCG tablet, Take  by mouth Daily., Disp: , Rfl:     coenzyme Q10 100 MG capsule, Take 100 mg by mouth Daily., Disp: , Rfl:     Continuous Blood Gluc Sensor (FreeStyle Thuy 2 Sensor) misc, USE TO monitor blood sugar WITH Free Style Thuy APPLICATORFUL, Disp: , Rfl:     DHEA 50 MG capsule, Take 20 mg by mouth Daily., Disp: , Rfl:     diclofenac (VOLTAREN) 1 % gel gel, Place 2 g on the skin., Disp: , Rfl:     ketoconazole (NIZORAL) 2 % shampoo, USE 10 MLS TOPICALLY TO SCALP TWICE A WEEK, Disp: , Rfl:     Methylcobalamin (METHYL B-12 PO), Take  by mouth., Disp: , Rfl:     Multiple Vitamin (MULTI-VITAMIN DAILY PO), Take  by mouth., Disp: , Rfl:     NON FORMULARY, Daily. Generic fiber drink supplement, Disp: , Rfl:     NP Thyroid 15 MG tablet, , Disp: , Rfl:     NP Thyroid 30 MG tablet, TAKE 1/2 TABLET BY MOUTH EVERY MORNING WITH 60MG TABLET FOR A TOTAL OF 75 MG DAILY, Disp: , Rfl:     NP Thyroid 60 MG tablet, TAKE ONE TABLET BY MOUTH EVERY MORNING WITH 15MG FOR A TOTAL OF 75 MG DAILY, Disp: , Rfl:     Omega-3 1000 MG capsule, Take  by mouth.,  Disp: , Rfl:     Prenatal Vit-Fe Fumarate-FA (PRENATAL, CLASSIC, VITAMIN) 28-0.8 MG tablet tablet, Take  by mouth Daily., Disp: , Rfl:     Probiotic Product (PROBIOTIC ADVANCED PO), Take  by mouth., Disp: , Rfl:     progesterone (PROMETRIUM) 100 MG capsule, Take 100 mg by mouth Daily., Disp: , Rfl:     Synthroid 75 MCG tablet, , Disp: , Rfl:     vitamin C (ASCORBIC ACID) 250 MG tablet, Take 250 mg by mouth Daily., Disp: , Rfl:     Vitamin D, Cholecalciferol, 400 UNITS capsule, Take  by mouth., Disp: , Rfl:    SOCIAL Hx:  [unfilled]    The following portions of the patient's history were reviewed and updated as appropriate: allergies, current medications, past family history, past medical history, past social history, past surgical history and problem list.    Review of Systems      Urinary incontinence assessment discussed      Except as outlined in history of physical illness, patient denies any changes in her GYN, , GI systems.  All other systems reviewed are negative         Objective   Physical Exam    LMP 03/22/2019 (Approximate)     General: Patient is alert and oriented and appears overall healthy  Neck: Is supple without thyromegaly, no carotid bruits and no lymphadenopathy  Lungs: Clear bilaterally, no wheezing, rhonchi, or rales.  Respiratory rate is normal  Breast: Even symmetrical, no lymphadenopathy, no retraction, no discharge ,no masses , lumps appreciated on either side  Heart: Regular rate and rhythm are appreciated, no murmurs or rubs are heard  Abdomen: Is soft, without organomegaly, bowel sounds are positive, there is no rebound or guarding and palpation does not produce any discomfort  Back: Nontender without CVA tenderness  Pelvic: External genitalia appear normal and consistent with mature female.  BUS normal                Urethra appears normal and without mass, bladder is nontender and without any lesions                        Urethral meatus is normal without scarring tenderness or  masses                 Bladder is without tenderness or fullness                           Vagina is clean dry without discharge and , no lesions or masses are present                         Cervix is noninflamed without discharge or lesions.  There is no cervical motion tenderness.                Uterus is nonenlarged, without tenderness, and no masses or abnormalities are  present               Adnexa are non-enlarged, non tender               Rectal digital  exam reveals adequate sphincter tone and no masses or lesions are appreciated on digital rectal examination.       There is no problem list on file for this patient.               Assessment & Plan   Diagnoses and all orders for this visit:    1. Breast cancer screening by mammogram (Primary)  Despite strong recommendations for screening, patient continues to decline patient gets thermograms and we discussed the reliability concerns.  Fortunately today's breast exam is reassuring but she understands limitations of clinical exam  2. Screening for cervical cancer  Pap smear  3. Encounter for annual physical exam      Discussed today's findings and concerns with patient.  Continue to recommend regular exercise including cardiovascular and resistance training as well as  breast self-exam. Wellness lab, mammography, & pap smear, in accordance with age guidelines.    I have encouraged her to call for today's test results if she has not received them within 10 days.  Patient is advised to call with any change in her condition or with any other questions, otherwise return  for annual examination.

## 2024-07-02 ENCOUNTER — OFFICE VISIT (OUTPATIENT)
Dept: OBSTETRICS AND GYNECOLOGY | Age: 54
End: 2024-07-02
Payer: COMMERCIAL

## 2024-07-02 VITALS
WEIGHT: 140 LBS | HEIGHT: 62 IN | DIASTOLIC BLOOD PRESSURE: 70 MMHG | BODY MASS INDEX: 25.76 KG/M2 | SYSTOLIC BLOOD PRESSURE: 116 MMHG

## 2024-07-02 DIAGNOSIS — Z01.419 ENCOUNTER FOR GYNECOLOGICAL EXAMINATION: Primary | ICD-10-CM

## 2024-07-02 DIAGNOSIS — Z53.20 MAMMOGRAM DECLINED: ICD-10-CM

## 2024-07-02 DIAGNOSIS — Z12.4 SCREENING FOR CERVICAL CANCER: ICD-10-CM

## 2024-07-02 NOTE — PROGRESS NOTES
Subjective   Chief Complaint   Patient presents with    Gynecologic Exam     Annual:last pap 6/23,Thermagram 6/24,cologuard 1/2022,would like order for another cologuard      History of Present Illness  Wellness exam  Lori Rocha is a very pleasant  54 y.o. female .  , Mammo Exam continues to decline despite my recommendations otherwise, , Exercise both cardio and resistance  Patient is postmenopausal overall very healthy has no GYN concerns or complaints she has another year before she needs a repeat Cologuard.    Obstetric History:  OB History    No obstetric history on file.        Menstrual History:     Patient's last menstrual period was 03/22/2019 (approximate).       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    Current Outpatient Medications:     Multiple Vitamin (MULTI-VITAMIN DAILY PO), Take  by mouth., Disp: , Rfl:     NON FORMULARY, Daily. Generic fiber drink supplement, Disp: , Rfl:     NP Thyroid 15 MG tablet, , Disp: , Rfl:     NP Thyroid 30 MG tablet, TAKE 1/2 TABLET BY MOUTH EVERY MORNING WITH 60MG TABLET FOR A TOTAL OF 75 MG DAILY, Disp: , Rfl:     Omega-3 1000 MG capsule, Take  by mouth., Disp: , Rfl:     Probiotic Product (PROBIOTIC ADVANCED PO), Take  by mouth., Disp: , Rfl:     vitamin C (ASCORBIC ACID) 250 MG tablet, Take 1 tablet by mouth Daily., Disp: , Rfl:     Vitamin D, Cholecalciferol, 400 UNITS capsule, Take  by mouth., Disp: , Rfl:     [place] Thyroid 90 MG tablet, , Disp: , Rfl:     Chromium 500 MCG tablet, Take  by mouth Daily. (Patient not taking: Reported on 7/2/2024), Disp: , Rfl:     coenzyme Q10 100 MG capsule, Take 100 mg by mouth Daily. (Patient not taking: Reported on 6/5/2023), Disp: , Rfl:     Continuous Blood Gluc Sensor (FreeStyle Thuy 2 Sensor) misc, USE TO monitor blood sugar WITH Free Style Thuy  "APPLICATORFUL (Patient not taking: Reported on 7/2/2024), Disp: , Rfl:     DHEA 50 MG capsule, Take 20 mg by mouth Daily. (Patient not taking: Reported on 6/5/2023), Disp: , Rfl:     diclofenac (VOLTAREN) 1 % gel gel, Place 2 g on the skin. (Patient not taking: Reported on 6/5/2023), Disp: , Rfl:     ketoconazole (NIZORAL) 2 % shampoo, USE 10 MLS TOPICALLY TO SCALP TWICE A WEEK (Patient not taking: Reported on 6/5/2023), Disp: , Rfl:     Methylcobalamin (METHYL B-12 PO), Take  by mouth. (Patient not taking: Reported on 6/5/2023), Disp: , Rfl:     NP Thyroid 60 MG tablet, TAKE ONE TABLET BY MOUTH EVERY MORNING WITH 15MG FOR A TOTAL OF 75 MG DAILY (Patient not taking: Reported on 6/5/2023), Disp: , Rfl:     Prenatal Vit-Fe Fumarate-FA (PRENATAL, CLASSIC, VITAMIN) 28-0.8 MG tablet tablet, Take  by mouth Daily. (Patient not taking: Reported on 6/5/2023), Disp: , Rfl:     progesterone (PROMETRIUM) 100 MG capsule, Take 100 mg by mouth Daily. (Patient not taking: Reported on 6/5/2023), Disp: , Rfl:     Synthroid 75 MCG tablet, , Disp: , Rfl:    SOCIAL Hx:  [unfilled]    The following portions of the patient's history were reviewed and updated as appropriate: allergies, current medications, past family history, past medical history, past social history, past surgical history and problem list.    Review of Systems      Urinary incontinence assessment discussed      Except as outlined in history of physical illness, patient denies any changes in her GYN, , GI systems.  All other systems reviewed are negative         Objective   Physical Exam    /70   Ht 158.1 cm (62.25\")   Wt 63.5 kg (140 lb)   LMP 03/22/2019 (Approximate)   BMI 25.40 kg/m²     General: Patient is alert and oriented and appears overall healthy  Neck: Is supple without thyromegaly, no carotid bruits and no lymphadenopathy  Lungs: Clear bilaterally, no wheezing, rhonchi, or rales.  Respiratory rate is normal  Breast: Even symmetrical, no " lymphadenopathy, no retraction, no discharge ,no masses or lumps appreciated on either side  Heart: Regular rate and rhythm are appreciated, no murmurs or rubs are heard  Abdomen: Is soft, without organomegaly, bowel sounds are positive, there is no rebound or guarding and palpation does not produce any discomfort  Back: Nontender without CVA tenderness  Pelvic: External genitalia appear normal and consistent with mature female.  BUS normal                Urethra appears normal and without mass, bladder is nontender and without any lesions                        Urethral meatus is normal without scarring tenderness or masses                 Bladder is without tenderness or fullness                           Vagina is clean dry without discharge and , no lesions or masses are present                         Cervix is noninflamed without discharge or lesions.  There is no cervical motion tenderness.                Uterus is nonenlarged, without tenderness, and no masses or abnormalities are  present               Adnexa are non-enlarged, non tender               Rectal digital  exam reveals adequate sphincter tone and no masses or lesions are appreciated on digital rectal examination.       Patient Active Problem List   Diagnosis    Mammogram declined                Assessment & Plan   Diagnoses and all orders for this visit:    1. Encounter for gynecological examination (Primary)  -     IGP, Apt HPV,rfx 16 / 18,45    2. Screening for cervical cancer    3. Mammogram declined      Discussed today's findings and concerns with patient.  Continue to recommend regular exercise including cardiovascular and resistance training as well as  breast self-exam. Wellness lab, mammography, & pap smear, in accordance with age guidelines.    I have encouraged her to call for today's test results if she has not received them within 10 days.  Patient is advised to call with any change in her condition or with any other questions,  otherwise return  for annual examination.

## 2024-07-06 LAB
CYTOLOGIST CVX/VAG CYTO: NORMAL
CYTOLOGY CVX/VAG DOC CYTO: NORMAL
CYTOLOGY CVX/VAG DOC THIN PREP: NORMAL
DX ICD CODE: NORMAL
HPV I/H RISK 4 DNA CVX QL PROBE+SIG AMP: NEGATIVE
Lab: NORMAL
OTHER STN SPEC: NORMAL
STAT OF ADQ CVX/VAG CYTO-IMP: NORMAL

## 2025-07-08 ENCOUNTER — OFFICE VISIT (OUTPATIENT)
Dept: OBSTETRICS AND GYNECOLOGY | Age: 55
End: 2025-07-08
Payer: COMMERCIAL

## 2025-07-08 VITALS
DIASTOLIC BLOOD PRESSURE: 68 MMHG | BODY MASS INDEX: 24.66 KG/M2 | WEIGHT: 134 LBS | SYSTOLIC BLOOD PRESSURE: 104 MMHG | HEIGHT: 62 IN

## 2025-07-08 DIAGNOSIS — Z53.20 MAMMOGRAM DECLINED: ICD-10-CM

## 2025-07-08 DIAGNOSIS — Z12.4 SCREENING FOR CERVICAL CANCER: ICD-10-CM

## 2025-07-08 DIAGNOSIS — Z12.11 SCREENING FOR COLON CANCER: ICD-10-CM

## 2025-07-08 DIAGNOSIS — Z01.419 ENCOUNTER FOR GYNECOLOGICAL EXAMINATION: Primary | ICD-10-CM

## 2025-07-08 NOTE — PROGRESS NOTES
Subjective   Chief Complaint   Patient presents with    Gynecologic Exam     Annual:last pap 7/24,thermagram 6/24,cologuard 1/22,Needs new order for cologuard      History of Present Illness  Wellness exam  Lori Rocha is a very pleasant  55 y.o. female .  , Mammo Exam despite our recommendation patient continues to decline any mammography,, , Exercise doing very well both cardiovascular and resistance training she gets up every morning and works out at 530  Is up-to-date on her wellness labs.  She would like to have another Cologuard ordered which I am in the process of placing.  She has no GYN concerns or complaints.    Obstetric History:  OB History    No obstetric history on file.        Menstrual History:     Patient's last menstrual period was 03/22/2019 (approximate).       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    Current Outpatient Medications:     Multiple Vitamin (MULTI-VITAMIN DAILY PO), Take  by mouth., Disp: , Rfl:     NP Thyroid 15 MG tablet, 3 (Three) Times a Day., Disp: , Rfl:     Omega-3 1000 MG capsule, Take  by mouth., Disp: , Rfl:     Probiotic Product (PROBIOTIC ADVANCED PO), Take  by mouth., Disp: , Rfl:     vitamin C (ASCORBIC ACID) 250 MG tablet, Take 1 tablet by mouth Daily., Disp: , Rfl:     [place] Thyroid 90 MG tablet, , Disp: , Rfl:     Chromium 500 MCG tablet, Take  by mouth Daily. (Patient not taking: Reported on 7/2/2024), Disp: , Rfl:     coenzyme Q10 100 MG capsule, Take 100 mg by mouth Daily. (Patient not taking: Reported on 6/5/2023), Disp: , Rfl:     Continuous Blood Gluc Sensor (FreeStyle Thuy 2 Sensor) misc, USE TO monitor blood sugar WITH Free Style Thuy APPLICATORFUL (Patient not taking: Reported on 7/8/2025), Disp: , Rfl:     DHEA 50 MG capsule, Take 20 mg by mouth Daily. (Patient not taking: Reported on  6/5/2023), Disp: , Rfl:     diclofenac (VOLTAREN) 1 % gel gel, Place 2 g on the skin. (Patient not taking: Reported on 6/5/2023), Disp: , Rfl:     ketoconazole (NIZORAL) 2 % shampoo, USE 10 MLS TOPICALLY TO SCALP TWICE A WEEK (Patient not taking: Reported on 6/5/2023), Disp: , Rfl:     Methylcobalamin (METHYL B-12 PO), Take  by mouth. (Patient not taking: Reported on 6/5/2023), Disp: , Rfl:     NON FORMULARY, Daily. Generic fiber drink supplement, Disp: , Rfl:     NP Thyroid 30 MG tablet, TAKE 1/2 TABLET BY MOUTH EVERY MORNING WITH 60MG TABLET FOR A TOTAL OF 75 MG DAILY (Patient not taking: Reported on 7/8/2025), Disp: , Rfl:     NP Thyroid 60 MG tablet, TAKE ONE TABLET BY MOUTH EVERY MORNING WITH 15MG FOR A TOTAL OF 75 MG DAILY (Patient not taking: Reported on 6/5/2023), Disp: , Rfl:     Prenatal Vit-Fe Fumarate-FA (PRENATAL, CLASSIC, VITAMIN) 28-0.8 MG tablet tablet, Take  by mouth Daily. (Patient not taking: Reported on 6/5/2023), Disp: , Rfl:     progesterone (PROMETRIUM) 100 MG capsule, Take 100 mg by mouth Daily. (Patient not taking: Reported on 6/5/2023), Disp: , Rfl:     Synthroid 75 MCG tablet, , Disp: , Rfl:     Vitamin D, Cholecalciferol, 400 UNITS capsule, Take  by mouth., Disp: , Rfl:    SOCIAL Hx:  [unfilled]    The following portions of the patient's history were reviewed and updated as appropriate: allergies, current medications, past family history, past medical history, past social history, past surgical history and problem list.    Review of Systems      Urinary incontinence assessment discussed      Except as outlined in history of physical illness, patient denies any changes in her GYN, , GI systems.  All other systems reviewed are negative      REVIEW OF SYSTEMS:   Constitutional: Oriented, no unexpected weight loss/gain, or fatigue   Cardiovascular: No chest pain, irregular heart beat   Respiratory: No wheezing or shortness of breath   Gastrointestinal: No nausea, vomiting, constipation,  "diarrhea, bloody stool   Genitourinary: No pelvic pain or incontinence   Musculoskeletal: No muscle pain or swollen joints   Skin/Breast: No rash or change in mole(s), no breast pain or lumps   Psychiatric: No depression or anxiety        Objective   Physical Exam    /68   Ht 158.1 cm (62.25\")   Wt 60.8 kg (134 lb)   LMP 03/22/2019 (Approximate)   BMI 24.31 kg/m²     General: Patient is alert and oriented and appears overall healthy  Neck: Is supple without thyromegaly, no carotid bruits and no lymphadenopathy  Lungs: Clear bilaterally, no wheezing, rhonchi, or rales.  Respiratory rate is normal  Breast: Even symmetrical, no lymphadenopathy, no retraction, no discharge ,no masses or lumps appreciated on either side  Heart: Regular rate and rhythm are appreciated, no murmurs or rubs are heard  Abdomen: Is soft, without organomegaly, bowel sounds are positive, there is no rebound or guarding and palpation does not produce any discomfort  Back: Nontender without CVA tenderness  Pelvic: External genitalia appear normal and consistent with mature female.  BUS normal                Urethra appears normal and without mass, bladder is nontender and without any lesions                        Urethral meatus is normal without scarring tenderness or masses                 Bladder is without tenderness or fullness                           Vagina is clean dry without discharge and , no lesions or masses are present                         Cervix is noninflamed without discharge or lesions.  There is no cervical motion tenderness.                Uterus is nonenlarged, without tenderness, and no masses or abnormalities are  present               Adnexa are non-enlarged, non tender               Rectal digital  exam reveals adequate sphincter tone and no masses or lesions are appreciated on digital rectal examination.       Patient Active Problem List   Diagnosis    Mammogram declined                Assessment & Plan "   Diagnoses and all orders for this visit:    1. Encounter for gynecological examination (Primary)  -     IGP, Apt HPV,rfx 16 / 18,45    2. Screening for colon cancer  -     Cologuard - Stool, Per Rectum; Future  -     Cologuard - Stool, Per Rectum; Future    3. Screening for cervical cancer    4. Mammogram declined  Overview:  Despite repeated annual recommendations for screening mammography patient continues to refuse every year.  Understands risk         Discussed today's findings and concerns with patient.  Continue to recommend regular exercise including cardiovascular and resistance training as well as  breast self-exam. Wellness lab, mammography, & pap smear, in accordance with age guidelines.    I have encouraged her to call for today's test results if she has not received them within 10 days.  Patient is advised to call with any change in her condition or with any other questions, otherwise return  for annual examination.

## 2025-07-11 LAB
CYTOLOGIST CVX/VAG CYTO: NORMAL
CYTOLOGY CVX/VAG DOC CYTO: NORMAL
CYTOLOGY CVX/VAG DOC THIN PREP: NORMAL
DX ICD CODE: NORMAL
HPV I/H RISK 4 DNA CVX QL PROBE+SIG AMP: NEGATIVE
OTHER STN SPEC: NORMAL
SERVICE CMNT-IMP: NORMAL
STAT OF ADQ CVX/VAG CYTO-IMP: NORMAL

## 2025-07-29 ENCOUNTER — TELEPHONE (OUTPATIENT)
Dept: OBSTETRICS AND GYNECOLOGY | Age: 55
End: 2025-07-29
Payer: COMMERCIAL